# Patient Record
Sex: FEMALE | Race: BLACK OR AFRICAN AMERICAN | NOT HISPANIC OR LATINO | Employment: OTHER | URBAN - METROPOLITAN AREA
[De-identification: names, ages, dates, MRNs, and addresses within clinical notes are randomized per-mention and may not be internally consistent; named-entity substitution may affect disease eponyms.]

---

## 2017-08-21 DIAGNOSIS — I10 HYPERTENSION, ESSENTIAL: Primary | ICD-10-CM

## 2017-09-26 ENCOUNTER — HOSPITAL ENCOUNTER (OUTPATIENT)
Dept: CARDIOLOGY | Facility: HOSPITAL | Age: 52
Discharge: HOME | End: 2017-09-26
Attending: FAMILY MEDICINE
Payer: MEDICAID

## 2017-09-26 DIAGNOSIS — I10 HYPERTENSION, ESSENTIAL: ICD-10-CM

## 2017-09-26 LAB
DIASTOLIC DYSFUNCTION: NO
ESTIMATED PA SYSTOLIC PRESSURE: 20.47
GLOBAL PERICARDIAL EFFUSION: NORMAL
MITRAL VALVE MOBILITY: NORMAL
RETIRED EF AND QEF - SEE NOTES: 55 (ref 55–65)
TRICUSPID VALVE REGURGITATION: NORMAL

## 2017-09-26 PROCEDURE — 93306 TTE W/DOPPLER COMPLETE: CPT

## 2017-09-26 PROCEDURE — 93306 TTE W/DOPPLER COMPLETE: CPT | Mod: 26,,, | Performed by: INTERNAL MEDICINE

## 2018-01-24 ENCOUNTER — HOSPITAL ENCOUNTER (EMERGENCY)
Facility: HOSPITAL | Age: 53
Discharge: HOME | End: 2018-01-24
Attending: EMERGENCY MEDICINE
Payer: MEDICAID

## 2018-01-24 VITALS
DIASTOLIC BLOOD PRESSURE: 69 MMHG | OXYGEN SATURATION: 96 % | BODY MASS INDEX: 32.95 KG/M2 | RESPIRATION RATE: 18 BRPM | SYSTOLIC BLOOD PRESSURE: 150 MMHG | HEART RATE: 64 BPM | TEMPERATURE: 99 F | HEIGHT: 64 IN | WEIGHT: 193 LBS

## 2018-01-24 DIAGNOSIS — J06.9 ACUTE URI: Primary | ICD-10-CM

## 2018-01-24 DIAGNOSIS — R05.9 COUGH: ICD-10-CM

## 2018-01-24 LAB
DEPRECATED S PYO AG THROAT QL EIA: NEGATIVE
FLUAV AG SPEC QL IA: NEGATIVE
FLUBV AG SPEC QL IA: NEGATIVE
POCT GLUCOSE: 84 MG/DL (ref 70–110)
SPECIMEN SOURCE: NORMAL

## 2018-01-24 PROCEDURE — 87081 CULTURE SCREEN ONLY: CPT

## 2018-01-24 PROCEDURE — 25000003 PHARM REV CODE 250: Performed by: PHYSICIAN ASSISTANT

## 2018-01-24 PROCEDURE — 87880 STREP A ASSAY W/OPTIC: CPT

## 2018-01-24 PROCEDURE — 87400 INFLUENZA A/B EACH AG IA: CPT | Mod: 59

## 2018-01-24 PROCEDURE — 99284 EMERGENCY DEPT VISIT MOD MDM: CPT | Mod: 25

## 2018-01-24 PROCEDURE — 82962 GLUCOSE BLOOD TEST: CPT

## 2018-01-24 RX ORDER — IBUPROFEN 600 MG/1
600 TABLET ORAL
Status: COMPLETED | OUTPATIENT
Start: 2018-01-24 | End: 2018-01-24

## 2018-01-24 RX ORDER — BENZONATATE 100 MG/1
100 CAPSULE ORAL 3 TIMES DAILY PRN
Qty: 20 CAPSULE | Refills: 0 | Status: SHIPPED | OUTPATIENT
Start: 2018-01-24 | End: 2018-02-03

## 2018-01-24 RX ORDER — OXYMETAZOLINE HCL 0.05 %
1 SPRAY, NON-AEROSOL (ML) NASAL 2 TIMES DAILY
Qty: 15 ML | Refills: 0 | Status: SHIPPED | OUTPATIENT
Start: 2018-01-24 | End: 2018-01-27

## 2018-01-24 RX ORDER — TIZANIDINE 4 MG/1
4 TABLET ORAL EVERY 6 HOURS PRN
COMMUNITY
End: 2020-04-14

## 2018-01-24 RX ORDER — HYDROCHLOROTHIAZIDE 25 MG/1
25 TABLET ORAL DAILY
COMMUNITY
End: 2019-10-21

## 2018-01-24 RX ORDER — AMLODIPINE BESYLATE 10 MG/1
10 TABLET ORAL DAILY
COMMUNITY
End: 2019-10-21

## 2018-01-24 RX ADMIN — IBUPROFEN 600 MG: 600 TABLET, FILM COATED ORAL at 09:01

## 2018-01-24 NOTE — ED PROVIDER NOTES
"Encounter Date: 1/24/2018    SCRIBE #1 NOTE: I, Kareen Hebert, am scribing for, and in the presence of,  Jim Benitez PA-C. I have scribed the following portions of the note - Other sections scribed: HPI and ROS.       History     Chief Complaint   Patient presents with    Generalized Body Aches     body aches, cough - yellow, chills, sore throat, headaches x 4 days.  +nausea and fever.  denies vomiting or diarrhea.     CC: Generalized Body Aches    HPI: This 52 y.o female who has Asthma, GERD, Hypercholesteremia, Hypertension, Arthritis, and Thyroid disease presents to the ED c/o acute, constant, 9/10 generalized body aches that began 4 days ago.  Patient reports of associated productive cough with yellow sputum, fever, chills, nausea, sore throat, rhinorrhea, nasal congestion, "sharp" chest pain with and without cough, and frontal headache. Patient states she thinks she has the flu.  She reports she has been attempting OTC treatment, such as Dayquil, which has been elevating her blood sugars and blood pressure.  Patient reports she was recently evaluated and diagnosed with the flu 12/27/17 by her PCP after having similar symptoms.  Patient reports she thinks her symptoms returned due to her  currently having similar symptoms.   Patient reports she could not await to obtain another appointment with her PCP as she "could not breathe" as a result of her nasal congestion last night.  Patient denies emesis, diarrhea, abdominal pain, ear pain, rash, shortness of breath, or any other associated symptoms.  No alleviating factors.  Last administered antibiotics 1 year ago.      The history is provided by the patient. No  was used.     Review of patient's allergies indicates:   Allergen Reactions    Glimepiride Anaphylaxis    Lisinopril Anaphylaxis     Past Medical History:   Diagnosis Date    ACL tear     Arthritis     Asthma     GERD (gastroesophageal reflux disease)     High " cholesterol     Hypertension     Thyroid disease      Past Surgical History:   Procedure Laterality Date    HYSTERECTOMY      partial     History reviewed. No pertinent family history.  Social History   Substance Use Topics    Smoking status: Former Smoker    Smokeless tobacco: Never Used    Alcohol use Yes     Review of Systems   Constitutional: Positive for chills and fever.   HENT: Positive for congestion, rhinorrhea and sore throat. Negative for ear pain.    Eyes: Negative for pain.   Respiratory: Positive for cough. Negative for shortness of breath.    Cardiovascular: Positive for chest pain.   Gastrointestinal: Positive for nausea. Negative for abdominal pain, diarrhea and vomiting.   Genitourinary: Negative for dysuria.   Musculoskeletal: Positive for myalgias. Negative for back pain.   Skin: Negative for rash.   Neurological: Positive for headaches.       Physical Exam     Initial Vitals [01/24/18 0810]   BP Pulse Resp Temp SpO2   (!) 145/85 72 18 98.8 °F (37.1 °C) 99 %      MAP       105         Physical Exam    Nursing note and vitals reviewed.  Constitutional: She appears well-developed and well-nourished. She is not diaphoretic. No distress.   HENT:   Head: Normocephalic and atraumatic.   Nose: Nose normal.   Eyes: Conjunctivae and EOM are normal. Right eye exhibits no discharge. Left eye exhibits no discharge.   Neck: Normal range of motion. No tracheal deviation present. No JVD present.   Cardiovascular: Normal rate, regular rhythm and normal heart sounds. Exam reveals no friction rub.    No murmur heard.  Pulmonary/Chest: Breath sounds normal. No stridor. No respiratory distress. She has no wheezes. She has no rhonchi. She has no rales. She exhibits no tenderness.   Abdominal: Soft. She exhibits no distension. There is no tenderness. There is no rigidity, no rebound and no guarding.   Musculoskeletal: Normal range of motion.   Neurological: She is alert and oriented to person, place, and time.    Skin: Skin is warm and dry. No rash and no abscess noted. No erythema. No pallor.         ED Course   Procedures  Labs Reviewed   THROAT SCREEN, RAPID   CULTURE, STREP A,  THROAT   INFLUENZA A AND B ANTIGEN   POCT GLUCOSE          X-Rays:   Independently Interpreted Readings:   Chest X-Ray: No PNA     Medical Decision Making:   History:   Old Medical Records: I decided to obtain old medical records.  Initial Assessment:   52-year-old female with no pertinent past medical history presents to the emergency department with URI symptoms.  Mild hypertension, but vitals otherwise within normal limits and afebrile.  Independently Interpreted Test(s):   I have ordered and independently interpreted X-rays - see prior notes.  Clinical Tests:   Lab Tests: Ordered  Radiological Study: Ordered and Reviewed  ED Management:  Presentation most consistent with viral URI.  No pneumonia.  Flu negative.  I carefully consider but doubt cardiac and pulmonary etiology.  No other findings, including for meningitis, otitis media, mastoiditis, sinusitis, strep throat.    Patient remains well-appearing well in emergency department.  Vitals stable.  Advising follow up with PCP.  Strict return precautions discussed.  Patient agreeable to plan.  Other:   I have discussed this case with another health care provider.       <> Summary of the Discussion: Case reviewed with Dr. Gamble who is in agreement with my assessment and plan.             Scribe Attestation:   Scribe #1: I performed the above scribed service and the documentation accurately describes the services I performed. I attest to the accuracy of the note.    Attending Attestation:           Physician Attestation for Scribe:  Physician Attestation Statement for Scribe #1: I, Jim Benitez PA-C, reviewed documentation, as scribed by Kareen Hebert in my presence, and it is both accurate and complete.                 ED Course      Clinical Impression:   The primary encounter diagnosis  was Acute URI. A diagnosis of Cough was also pertinent to this visit.    Disposition:   Disposition: Discharged  Condition: Stable                        Jim Benitez PA-C  01/24/18 8528

## 2018-01-24 NOTE — ED TRIAGE NOTES
C/o chills, poor appetite, body aches, CP with cough, sore throat, chest congestion, SOB x 4 days.  Inhaler used today.   Dayquil, cough syrup taken today.

## 2018-01-26 LAB — BACTERIA THROAT CULT: NORMAL

## 2018-06-11 ENCOUNTER — HOSPITAL ENCOUNTER (EMERGENCY)
Facility: HOSPITAL | Age: 53
Discharge: HOME | End: 2018-06-11
Attending: EMERGENCY MEDICINE
Payer: MEDICAID

## 2018-06-11 VITALS
HEART RATE: 63 BPM | OXYGEN SATURATION: 97 % | RESPIRATION RATE: 18 BRPM | DIASTOLIC BLOOD PRESSURE: 83 MMHG | TEMPERATURE: 98 F | SYSTOLIC BLOOD PRESSURE: 135 MMHG | WEIGHT: 190 LBS | BODY MASS INDEX: 32.44 KG/M2 | HEIGHT: 64 IN

## 2018-06-11 DIAGNOSIS — R52 PAIN: ICD-10-CM

## 2018-06-11 DIAGNOSIS — M25.521 RIGHT ELBOW PAIN: ICD-10-CM

## 2018-06-11 DIAGNOSIS — M79.602 LEFT ARM PAIN: Primary | ICD-10-CM

## 2018-06-11 PROCEDURE — 99283 EMERGENCY DEPT VISIT LOW MDM: CPT | Mod: 25

## 2018-06-11 PROCEDURE — 90471 IMMUNIZATION ADMIN: CPT | Performed by: PHYSICIAN ASSISTANT

## 2018-06-11 PROCEDURE — 25000003 PHARM REV CODE 250: Performed by: PHYSICIAN ASSISTANT

## 2018-06-11 PROCEDURE — 90715 TDAP VACCINE 7 YRS/> IM: CPT | Performed by: PHYSICIAN ASSISTANT

## 2018-06-11 PROCEDURE — 63600175 PHARM REV CODE 636 W HCPCS: Performed by: PHYSICIAN ASSISTANT

## 2018-06-11 RX ORDER — MELOXICAM 7.5 MG/1
7.5 TABLET ORAL DAILY
Qty: 12 TABLET | Refills: 0 | OUTPATIENT
Start: 2018-06-11 | End: 2018-12-01

## 2018-06-11 RX ORDER — IBUPROFEN 600 MG/1
600 TABLET ORAL
Status: COMPLETED | OUTPATIENT
Start: 2018-06-11 | End: 2018-06-11

## 2018-06-11 RX ADMIN — IBUPROFEN 600 MG: 600 TABLET ORAL at 03:06

## 2018-06-11 RX ADMIN — CLOSTRIDIUM TETANI TOXOID ANTIGEN (FORMALDEHYDE INACTIVATED), CORYNEBACTERIUM DIPHTHERIAE TOXOID ANTIGEN (FORMALDEHYDE INACTIVATED), BORDETELLA PERTUSSIS TOXOID ANTIGEN (GLUTARALDEHYDE INACTIVATED), BORDETELLA PERTUSSIS FILAMENTOUS HEMAGGLUTININ ANTIGEN (FORMALDEHYDE INACTIVATED), BORDETELLA PERTUSSIS PERTACTIN ANTIGEN, AND BORDETELLA PERTUSSIS FIMBRIAE 2/3 ANTIGEN 0.5 ML: 5; 2; 2.5; 5; 3; 5 INJECTION, SUSPENSION INTRAMUSCULAR at 03:06

## 2018-06-11 NOTE — ED TRIAGE NOTES
"Patient presents to the ED via personal vehicle alone. Patient reports pain from left shoulder down to wrist x "longer than a month". Patient has been seeing a orthopedic doctor about left arm. Patient is also reports that she had a fishing hook stuck through the skin on upper right arm x 2 days ago. Patient denies swelling, drainage from site, fever, chills.  "

## 2018-06-11 NOTE — ED PROVIDER NOTES
"Encounter Date: 6/11/2018      This is an initial triage evaluation of Leonarda Roth, a 53 y.o., female  that presents to the Emergency Department with c/o bilateral arm pain and swelling.  Patient states that a fishook went through the elbow area of the right arm on Saturday.  Patient complains of swelling and pain to her left arm from wrist to the shoulder for a while    Pertinent exam findings: none    Orders Pending : xray    Destination: q track    I have evaluated and provided a medical screening exam with initial orders placed, if indicated, to expedite care. The patient is stable to return to the waiting area and will be placed in a treatment area when one is available. Care will be transferred to an alternate provider when patient is roomed from the lobby for full assessment including: history, physical exam, additional orders, and final disposition.      GONZALO Bustamante, PIEDADP-C     History     Chief Complaint   Patient presents with    Arm Injury     right upper arm. Pt states "I had a fish hook go through my right arm on Saturday".    Arm Swelling     left arm swelling. States "it's from my wrist to my shoulder. I do everything with this arm so i don't know what could be wrong with it"     53-year-old female with chronic neck pain and arthritis presents to emergency department for multiple complaints. Patient 1st notes removing a fishing hook from her right lateral elbow 2 days ago at home and is is requesting a tetanus shot.  Patient notes there is minimal pain at site and symptoms are overall improving.  Denies fever, numbness, and joint pain. Patient is also noting 4 month history of intermittent left upper extremity pain that is progressively worsening.  Patient notes that she is left hand dominant and frequently engages and routine/repetitive tasks at home.  No relief with her Percocet at home.  No history of similar symptoms. Denies chest pain and shortness of breath. Denies acute neck " trauma.          Review of patient's allergies indicates:   Allergen Reactions    Glimepiride Anaphylaxis    Lisinopril Anaphylaxis     Past Medical History:   Diagnosis Date    ACL tear     Arthritis     Asthma     GERD (gastroesophageal reflux disease)     High cholesterol     Hypertension     Thyroid disease      Past Surgical History:   Procedure Laterality Date    HYSTERECTOMY      partial     History reviewed. No pertinent family history.  Social History   Substance Use Topics    Smoking status: Former Smoker    Smokeless tobacco: Never Used    Alcohol use Yes     Review of Systems   Constitutional: Negative for fever.   HENT: Negative for sore throat.    Respiratory: Negative for cough and shortness of breath.    Cardiovascular: Negative for chest pain.   Gastrointestinal: Negative for abdominal pain, nausea and vomiting.   Musculoskeletal: Positive for arthralgias and myalgias. Negative for back pain.   Skin: Negative for color change, rash and wound.   Neurological: Negative for numbness and headaches.   All other systems reviewed and are negative.      Physical Exam     Initial Vitals [06/11/18 1340]   BP Pulse Resp Temp SpO2   (!) 153/86 86 16 97.9 °F (36.6 °C) 98 %      MAP       --         Physical Exam    Nursing note and vitals reviewed.  Constitutional: She appears well-developed and well-nourished. She is not diaphoretic. No distress.   HENT:   Head: Normocephalic and atraumatic.   Nose: Nose normal.   Eyes: Conjunctivae and EOM are normal. Right eye exhibits no discharge. Left eye exhibits no discharge.   Neck: Normal range of motion. No tracheal deviation present. No JVD present.   Cardiovascular: Normal rate, regular rhythm and normal heart sounds. Exam reveals no friction rub.    No murmur heard.  Pulmonary/Chest: Breath sounds normal. No stridor. No respiratory distress. She has no wheezes. She has no rhonchi. She has no rales. She exhibits no tenderness.   Musculoskeletal:   Two  punctate superficial well-healing abrasions just above the R lateral elbow. No erythema or swelling. Full ROM of joint without pain or complication. Radial pulses 2+ and equal. Sensation intact. Equal  strength.     No bony joint TTP of the LUE. No snuffbox TTP. Full ROM of LUE joints, which reproduces pain to L posterior shoulder and L volar wrist. No swelling or erythema. Soft compartments. Radial pulses 2+ and equal with good skin perfusion. Sensation intact. (+) Tinel's at 4 seconds.     No midline TTP down neck. Full cervical ROM without pain.    Neurological: She is alert and oriented to person, place, and time.   Skin: Skin is warm and dry. No rash and no abscess noted. No erythema. No pallor.         ED Course   Procedures  Labs Reviewed - No data to display       X-Ray Humerus 2 View Left   Final Result      No acute displaced fracture-dislocation identified.         Electronically signed by: Lane Donahue MD   Date:    06/11/2018   Time:    14:33      X-Ray Forearm Left   Final Result      No acute displaced fracture-dislocation identified.         Electronically signed by: Lane Donahue MD   Date:    06/11/2018   Time:    14:32           Medical Decision Making:   History:   Old Medical Records: I decided to obtain old medical records.  Initial Assessment:   53-year-old female with multiple complaints. Reports fissure closer moved to right upper extremity and atraumatic left upper extremity pain.  Denies chest pain, shortness of breath, fever, and numbness.  Independently Interpreted Test(s):   I have ordered and independently interpreted X-rays - see summary below.       <> Summary of X-Ray Reading(s): No bony lesions  Clinical Tests:   Radiological Study: Ordered and Reviewed  ED Management:  Presentation most suspicious for bursitis of the left shoulder and carpal tunnel syndrome in this patient that is left hand dominant and engages in repetitive activities with her left upper extremity on a routine  basis and has same symptoms for several months. X-ray show no acute fracture or bony lesions. No neurovascular compromise.  No signs of acute infectious etiology, including for cellulitis and septic joint.  I doubt occult cardiac or pulmonary in etiology, including for pneumothorax.  No signs of infection to site where fishhook was removed by patient.  No visible or palpable foreign body.    Tetanus updated.  Pain controlled in the ED.  Sent home with supportive care.  Advising follow-up with PCP and Orthopedics.  Strict return by options discussed with patient.  Patient agreeable to plan.  Other:   I have discussed this case with another health care provider.       <> Summary of the Discussion: Discussed with attending                      Clinical Impression:   The primary encounter diagnosis was Left arm pain. Diagnoses of Pain and Right elbow pain were also pertinent to this visit.      Disposition:   Disposition: Discharged  Condition: Stable                        Jim Benitez PA-C  06/11/18 1925

## 2018-09-11 DIAGNOSIS — M51.36 LUMBAR DEGENERATIVE DISC DISEASE: Primary | ICD-10-CM

## 2018-09-18 ENCOUNTER — CLINICAL SUPPORT (OUTPATIENT)
Dept: REHABILITATION | Facility: OTHER | Age: 53
End: 2018-09-18
Attending: FAMILY MEDICINE
Payer: MEDICAID

## 2018-09-18 DIAGNOSIS — G89.29 CHRONIC BILATERAL LOW BACK PAIN WITH BILATERAL SCIATICA: ICD-10-CM

## 2018-09-18 DIAGNOSIS — M54.41 CHRONIC BILATERAL LOW BACK PAIN WITH BILATERAL SCIATICA: ICD-10-CM

## 2018-09-18 DIAGNOSIS — M54.42 CHRONIC BILATERAL LOW BACK PAIN WITH BILATERAL SCIATICA: ICD-10-CM

## 2018-09-18 PROBLEM — M54.40 CHRONIC BILATERAL LOW BACK PAIN WITH SCIATICA: Status: ACTIVE | Noted: 2018-09-18

## 2018-09-18 PROCEDURE — 97110 THERAPEUTIC EXERCISES: CPT

## 2018-09-18 PROCEDURE — 97162 PT EVAL MOD COMPLEX 30 MIN: CPT

## 2018-09-18 NOTE — PROGRESS NOTES
[facility] - PHYSICAL THERAPY EVALUATION     Name: Leonarda PABLO Gonzalez  Clinic Number: [id number]    Leonarda PABLO is a 53 y.o. female evaluated on 09/18/2018  Time In: 9:00   Time out: 10:30    Diagnosis:   Encounter Diagnosis   Name Primary?    Chronic bilateral low back pain with bilateral sciatica      Physician: Winsome Yang MD  Treatment Orders: PT Eval and Treat    Past Medical History:   Diagnosis Date    ACL tear     Arthritis     Asthma     GERD (gastroesophageal reflux disease)     High cholesterol     Hypertension     Thyroid disease      Current Outpatient Medications   Medication Sig    (Magic mouthwash) 1:1:1 Benadryl 12.5mg/5ml liq, aluminum & magnesium hydroxide-simehticone (Maalox), lidocaine viscous 2% Swish and spit 5 mLs every 4 (four) hours as needed (pain).    albuterol (PROVENTIL) 2 MG Tab Take 2 mg by mouth 3 (three) times daily.    alprazolam (XANAX) 2 MG Tab Take by mouth nightly as needed.    amLODIPine (NORVASC) 10 MG tablet Take 10 mg by mouth once daily.    ergocalciferol (VITAMIN D2) 50,000 unit Cap Take 50,000 Units by mouth every 7 days.    hydroCHLOROthiazide (HYDRODIURIL) 25 MG tablet Take 25 mg by mouth once daily.    hydrOXYzine pamoate (VISTARIL) 25 MG Cap Take 1 capsule (25 mg total) by mouth every 6 (six) hours as needed.    levothyroxine (SYNTHROID) 50 MCG tablet Take 50 mcg by mouth once daily.      meloxicam (MOBIC) 7.5 MG tablet Take 1 tablet (7.5 mg total) by mouth once daily.    metformin (GLUCOPHAGE) 500 MG tablet Take 500 mg by mouth 2 (two) times daily with meals.    omeprazole (PRILOSEC) 20 MG capsule Take 20 mg by mouth once daily.    oxycodone (ROXICODONE) 15 MG Tab Take 10 mg by mouth every 4 (four) hours as needed.    potassium bicarb-citric acid 10 mEq TbEF Take by mouth once.    pravastatin (PRAVACHOL) 20 MG tablet Take 20 mg by mouth once daily.      tiZANidine (ZANAFLEX) 4 MG tablet Take 4 mg by mouth every 6 (six) hours as needed.     vitamin E 100 UNIT capsule Take 100 Units by mouth once daily.     No current facility-administered medications for this visit.      Review of patient's allergies indicates:   Allergen Reactions    Glimepiride Anaphylaxis    Lisinopril Anaphylaxis     Precautions: Left ACL tear, fear avoidance, history of litigation for injury, left forearm pain, neck pain      Visit # authorized: 20  Authorization period: 12/31/18  Plan of care Expiration: 12/17/18, Sent 09/18/2018        HISTORY   History of Present Illness: Pt reports primary pain is in the mid-back and referrs into low back. Pt describes the pain as aching and occasionally sharp/shooting. Pt reports shooting pain from the bilateral foot to the knee and hip. She reports diabetic symptoms of bilateral foot tingling with prolong standing.  She uses medical transportation to get around but can sometimes get  to drive her secondary to car being broken down.   Her low back and neck pain started when book case fell on back at [facility]. Was in litigation but may appeal results at some point. Fell on the job several times when working. No worker's comp at this time.     Secondary complaints:   Pt reports torn ACL in left leg, working on getting surgery. Feels the pain is worsening. Bracing can help. Right knee arthritis.   Left elbow injury in June after a fish hook when through arm. Healed ok but had some left forearm overuse.   Pt also reports her doctor doesn't want her to walk on concrete     Diagnostic Tests: Pt reports MRI from [facility] revealed multiple disc herniations       Pain Scale: Leonarda PABLO rates pain on a scale of 0-10 to be 9 at worst; 6 currently; 5 at best using VAS.   Pain location: Low back, mid-back     Aggravating factors: Prolonged standing, walking, bending, lifting, prolonged sitting   Easing Factors: Medication   Disturbed Sleep: Yes, not sleeping well. Sleeps with sitting with back support. Can sometimes  sleep    Pattern of pain questions:  1.  Where is your pain the worst? Low back   2.  Is your pain constant or intermittent? Constant   3.  Does bending forward make your typical pain worse? Yes  4.  Since the start of your back pain, has there been a change in your bowel or bladder? No  5.  What can't you do now that you use to be able to do? 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   Bladder/ bowel: No  Falls: Yes, can stumble with long term walkiing secondary to ACL tear  General health: Good   Night pain: No   Unexplained weight loss: No     OBJECTIVE     POSTURE  Posture Alignment :slouched posture  Postural examination/scapula alignment: Rounded shoulder, Head forward, Increased kyphosis and Increased lordosis, left shoulder elevated compared to right left PSIS mildly elevated compared to right   Joint integrity: Pt unable to tolerate prone postion, TTP through lumbar paraspinal region and sacrum.   Sitting: Poor  Standing: Poor  Correction of posture: Added slimline roll, Improved posture in sitting.     SLS: Unable to perform  Trendelenburg: Right +, Left +  Functional squat: NT    MOVEMENT LOSS    ROM Loss   Flexion major loss and ERP, NW   Extension major loss   Side bending Right moderate loss   Side bending Left moderate loss   Rotation Right moderate loss   Rotation Left major loss and ERP       Lower Extremity Strength   Right LE  Left LE   Hip flexion: 3+/5 Hip flexion: 3+/5   Hip  extension:  NT Hip extension: NT   Hip abduction: 4-/5 Hip abduction: 3+/5   Hip adduction:  4-/5 Hip adduction:  4-/5   Hip Internal rotation   4/5 Hip Internal rotation 4/5   Knee Flexion 4/5 Knee Flexion 4/5   Knee Extension 3+/5 Knee Extension NT   Ankle dorsiflexion: 4/5 Ankle dorsiflexion: 4/5   Ankle plantarflexion: 4/5 Ankle plantarflexion: 4/5         GAIT:  Assistive Device used: none  Level of Assistance: independent  Patient displays the following gait deviations: antalgic gait.     Special Tests:   Test Name  Test Result   Prone Instability Test NT   SI Joint Provocation Test NT   Straight Leg Raise NT   Neural Tension Test (--)   Crossed Straight Leg Raise (--)   Walking on toes (--)   Walking on heels  (--)       NEUROLOGICAL SCREENING     Sensory deficit: LE intact to light touch    Reflexes:    Left Right   Patella Tendon 2+ 2+   Achilles Tendon 2+ 2+   Babinski NT NT   Clonus - -     REPEATED TEST  MOVEMENTS:  Repeated Flexion in Standing end range pain  no effect   Repeated Extension in Standing better  With LE supported    Repeated Flexion in lying Unable to tolerate supine postion   Repeated Extension in lying  Unable to attempt        STATIC TESTS   Sitting slouched  no effect   Sitting erect better   Standing slouched no effect   Standing erect  no effect   Lying prone in extension  NT   Long sitting   NT       Baseline Isometric Testing on Med X equipment: Testing administered by PT    Baseline IM Testing Results:   Date of testin2018  ROM 21-9 deg   Max Peak Torque 26    Min Peak Torque 13    Flex/Ext Ratio 2   % below normative data -88     Pt reported she was fearful to give full effort and potentially hurt her back. She was able to tolerate warm up at 30 ft/lbs without increased symptoms.     FOTO: Focus on Therapeutic Outcomes   Category: lumbar   % Impaired: 58%   Current Score  = CK = at least 40% but < 60% impaired, limited or restricted  Goal at Discharge Score = CK = at  least 40% but < 60% impaired, limited or restricted    Score interpretation is as follows:     TEST SCORE  Modifier  Impairment Limitation Restriction    0/50  CH  0 % impaired, limited or restricted   1-9/50  CI  @ least 1% but less than 20% impaired, limited or restricted   10-19/50  CJ  @ least 20%<40% impaired, limited or restricted   20-29/50  CK  @ least 40%<60% impaired, limited or restricted   30-39/50  CL  @ least 60% <80% impaired, limited or restricted   40-49/50  CM  @ least 80%<100% impaired limited or restricted   50/50  CN  100% impaired, limited or restricted       Treatment   Time In: 9:50  Time Out: 10:45    PT Evaluation Completed? Yes  Discussed Plan of Care with patient: Yes      Written Home Exercises Provided:   Handouts were given to the patient. Pt demo good understanding of the education provided. Leonarda PABLO demonstrated good return demonstration of activities.     - Patient received education regarding proper posture and body mechanics.    - Conner roll tried, recommended, and purchase information was provided.    - Patient received a handout regarding anticipated muscular soreness following the isometric test and strategies for management were reviewed with patient including stretching, using ice and scheduled rest.     HEP as follows       Extension in standing with LE supported 10x, 3x/daily  Sidelying pelvic tilts 5-10 s/h 5x, 2x daily  Seated cervical retractions 10x, 2x daily   Seated Ice 10-20 min, 2x daily      Pt was instructed in and performed the following:   Cardiovascular exercise and therapeutic exercise to improve posture, lumbar/cervical ROM, strength, and muscular endurance as follows:     Leonarda PABLO received therapeutic exercises to develop/improve posture, lumbar/cervical ROM, strength and muscular endurance for 45 minutes including the following exercises: med-x lumbar machine testing  [facility] 9/18/2018   Visit Number 1   VAS Pain Rating 6   Lumbar Extension  Seat Pad 2   Femur Restraint 7   Top Dead Center 24   Counterweight 211   Lumbar Flexion 21   Lumbar Extension 9   Lumbar Peak Torque 26   Min Torque 13   Test Percent Below Normative Data 88   Lumbar Weight 30   Ice - Sitting 10       Leonarda PABLO received the following manual therapy techniques: None     Assessment   This is a 53 y.o. female referred to [facility] and presents with a medical diagnosis of   Encounter Diagnosis   Name Primary?    Chronic bilateral low back pain with bilateral sciatica     and demonstrates limitations as described below in the problem list. Pt rehab potential is Good. Pt presents with lumbar dysfunction, decreased lumbar strength and ROM compared to norms, decreased LE ROM, decreased LE strength, decreased hip flexibility, poor postural alignment, poor body mechanics, impaired SLS balance, antalgic gait. Pt reported feeling better, more flexible by end of session. She expresses she was fearful of increasing symptoms on Med X isometric testing but was able to tolerate warm up at 30 ft/lbs without difficulty. Pt reports feeling very optimistic about becoming more active and exercising again.       Pain Pattern: 1 PEN (Pt high symptom irritably, only able to tolerate side lying and standing Ext with LE supported. Plan on progressing with caution)         Patient received education on the [facility] program, purpose of the isometric test, progression of back strengthening as well as wellness approach and systemic strengthening.  Details of the program were discussed.  Reviewed that patient should feel support/pressure from med ex restraints but no pain or discomfort and patient expressed understanding.    Based on the above history and physical examination an active physical therapy program is recommended.  Pt will continue to benefit from skilled outpatient physical therapy to address the deficits listed below in the chart, provide pt/family education and to maximize pt's level  of independence in the home and community environment. .     No environmental, cultural, spiritual, developmental or education needs expressed or noted    Medical necessity is demonstrated by the following problem list.    Pt presents with the following impairments:   History  Co-morbidities and personal factors that may impact the plan of care Examination  Body Structures and Functions, activity limitations and participation restrictions that may impact the plan of care Clinical Presentation   Decision Making/ Complexity Score   Co-morbidities:   See precautions        Personal Factors:   coping style  lifestyle  attitudes  Sedentary, fearful of movement Body Regions:   neck  back  lower extremities  trunk    Body Systems:   gross symmetry  ROM  strength  gross coordinated movement  balance  gait  transitions  motor control    Activity limitations:   Learning and applying knowledge  no deficits    General Tasks and Commands  no deficits    Communication  no deficits    Mobility  lifting and carrying objects  walking  driving (bike, car, motorcycle)    Self care  no deficits    Domestic Life  shopping  cooking  doing house work (cleaning house, washing dishes, laundry)    Interactions/Relationships  no deficits    Life Areas  employment    Community and Social Life  community life  recreation and leisure  Taoist and spirituality    Participation Restrictions:   Unable to work or participate in social events     evolving clinical presentation with changing clinical characteristics   Moderate          GOALS: Pt is in agreement with the following goals.    Short term goals:  6 weeks or 10 visits   1.  Pt will demonstrate increased lumbar ROM by at least 6 degrees from the initial ROM value with improvements noted in functional ROM and ability to perform ADLs  2.  Pt will demonstrate increased maximum isometric torque value by 10% when compared to the initial value resulting in improved ability to perform bending,  "lifting, and carrying activities safely, confidently.  3.  Patient report a reduction in worst pain score by 1-2 points for improved tolerance during work and recreational activities  4.  Pt able to perform HEP correctly with minimal cueing or supervision for therapist    Long term goals: 13 weeks or 20 visits   1. Pt will demonstrate increased lumbar ROM by at least 12 degrees from initial ROM value, resulting in improved ability to perform functional fwd bending while standing and sitting.   2. Pt will demonstrate increased maximum isometric torque value by 30% when compared to the initial value resulting in improved ability to perform bending, lifting, and carrying activities safely, confidently.  3. Pt to demonstrate ability to independently control and reduce their pain through posture positioning and mechanical movements throughout a typical day.  4.  Patient will demonstrate improved overall function per FOTO Survey to CL = least 60% but < 80% impaired, limited or restricted score or less.  5. Pt will demonstrate ability to walk up to 10 minutes without significant increases in symptoms.   6. Pt will report confidence to return to exercising without significantly increasing symptoms.       Plan   Outpatient physical therapy 2x week for 13 weeks or 20 visits to include the following:   - Patient education  - Therapeutic exercise  - Manual therapy  - Performance testing   - Neuromuscular Re-education  - Therapeutic activity   - Modalities    Pt may be seen by PTA as part of the rehabilitation team.     Therapist: Dhara Corbin, PT  9/18/2018    "I certify the need for these services furnished under this plan of treatment and while under my care."    ____________________________________  Physician/Referring Practitioner    _______________  Date of Signature            "

## 2018-12-01 ENCOUNTER — HOSPITAL ENCOUNTER (EMERGENCY)
Facility: HOSPITAL | Age: 53
Discharge: HOME | End: 2018-12-01
Attending: EMERGENCY MEDICINE
Payer: MEDICAID

## 2018-12-01 VITALS
HEIGHT: 64 IN | TEMPERATURE: 98 F | WEIGHT: 205 LBS | SYSTOLIC BLOOD PRESSURE: 163 MMHG | BODY MASS INDEX: 35 KG/M2 | HEART RATE: 73 BPM | OXYGEN SATURATION: 96 % | DIASTOLIC BLOOD PRESSURE: 87 MMHG | RESPIRATION RATE: 18 BRPM

## 2018-12-01 DIAGNOSIS — S80.01XA CONTUSION OF RIGHT KNEE, INITIAL ENCOUNTER: Primary | ICD-10-CM
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
## 2018-12-01 LAB — POCT GLUCOSE: 91 MG/DL (ref 70–110)

## 2018-12-01 PROCEDURE — 99284 EMERGENCY DEPT VISIT MOD MDM: CPT | Mod: 25

## 2018-12-01 PROCEDURE — 96374 THER/PROPH/DIAG INJ IV PUSH: CPT

## 2018-12-01 PROCEDURE — 63600175 PHARM REV CODE 636 W HCPCS: Performed by: NURSE PRACTITIONER

## 2018-12-01 PROCEDURE — 82962 GLUCOSE BLOOD TEST: CPT

## 2018-12-01 RX ORDER — DICLOFENAC SODIUM 10 MG/G
2 GEL TOPICAL 2 TIMES DAILY
Qty: 1 TUBE | Refills: 0 | Status: SHIPPED | OUTPATIENT
Start: 2018-12-01 | End: 2019-10-21 | Stop reason: SDUPTHER

## 2018-12-01 RX ORDER — MORPHINE SULFATE 4 MG/ML
5 INJECTION, SOLUTION INTRAMUSCULAR; INTRAVENOUS
Status: COMPLETED | OUTPATIENT
Start: 2018-12-01 | End: 2018-12-01

## 2018-12-01 RX ORDER — FUROSEMIDE 20 MG/1
20 TABLET ORAL 2 TIMES DAILY
COMMUNITY
End: 2019-10-21 | Stop reason: SDUPTHER

## 2018-12-01 RX ADMIN — MORPHINE SULFATE 5 MG: 4 INJECTION INTRAVENOUS at 04:12

## 2018-12-01 NOTE — ED PROVIDER NOTES
Encounter Date: 12/1/2018    SCRIBE #1 NOTE: I, Cassandra Dubois, am scribing for, and in the presence of,  Robe Kessler - NP. I have scribed the following portions of the note - Other sections scribed: HPI, ROS.       History     Chief Complaint   Patient presents with    Knee Pain     slipped and fell Thanksgiving Day on rug; denies hitting head or LOC; right knee pain and swelling since     CC: Knee Pain    54 y/o female with ACL tear, asthma, DM, high cholesterol, HTN, arthritis, and thyroid disease presents to the ED c/o acute onset generalized R knee pain (worse to frontal aspect) with associated bruising due to an accidental mechanical fall onto her knee on 11/22/18. The pain is severe (7/10) and worse with palpation and movement. The patient reports tripping on a rug causing her to fall on her b/l knees and b/l hands. She reports putting more of her weight on her R knee because she has a torn ACL to her L knee. The patient attempted her prescribed 15 mg Oxycodone as well as applying ice packs and elevated her R leg with no relief to her pain. The patient denies head trauma, LOC, or fever. No other symptoms reported.      The history is provided by the patient. No  was used.     Review of patient's allergies indicates:   Allergen Reactions    Glimepiride Anaphylaxis    Lisinopril Anaphylaxis     Past Medical History:   Diagnosis Date    ACL tear     Arthritis     Asthma     Diabetes mellitus     GERD (gastroesophageal reflux disease)     High cholesterol     Hypertension     Thyroid disease      Past Surgical History:   Procedure Laterality Date    HYSTERECTOMY      partial     History reviewed. No pertinent family history.  Social History     Tobacco Use    Smoking status: Former Smoker    Smokeless tobacco: Never Used   Substance Use Topics    Alcohol use: No     Frequency: Never    Drug use: No     Review of Systems   Constitutional: Negative for chills and fever.   HENT:  Negative for congestion, ear pain, rhinorrhea and sore throat.    Eyes: Negative for redness.   Respiratory: Negative for cough and shortness of breath.    Cardiovascular: Negative for chest pain.   Gastrointestinal: Negative for abdominal pain, diarrhea, nausea and vomiting.   Genitourinary: Negative for decreased urine volume, difficulty urinating, dysuria, frequency, hematuria and urgency.   Musculoskeletal: Negative for back pain and neck pain.        (+) generalized R knee pain with associated bruising   Skin: Negative for rash.   Neurological: Negative for headaches.        (-) head trauma  (-) LOC   Psychiatric/Behavioral: Negative for confusion.   All other systems reviewed and are negative.      Physical Exam     Initial Vitals [12/01/18 1426]   BP Pulse Resp Temp SpO2   (!) 133/92 84 20 98 °F (36.7 °C) 99 %      MAP       --         Physical Exam    Nursing note and vitals reviewed.  Constitutional: She appears well-developed and well-nourished.   HENT:   Head: Normocephalic and atraumatic.   Right Ear: External ear normal.   Left Ear: External ear normal.   Nose: Nose normal.   Eyes: Conjunctivae and EOM are normal. Right eye exhibits no discharge. Left eye exhibits no discharge.   Neck: Normal range of motion. Neck supple. No tracheal deviation present.   Cardiovascular: Normal rate.   Pulmonary/Chest: No stridor. No respiratory distress.   Abdominal: Soft. She exhibits no distension. There is no tenderness.   Musculoskeletal: Normal range of motion.        Right knee: She exhibits ecchymosis and bony tenderness. She exhibits normal range of motion, no swelling, no effusion, no deformity, no erythema, normal alignment, no LCL laxity, normal patellar mobility and no MCL laxity. Tenderness (Anterior) found.   Diffuse bruising to the right anterior knee.  Bruising appears to be resolving.  No swelling or effusion.  No abnormal patellar mobility or ligamentous laxity.  Negative anterior and posterior drawer  test.  Range of motion is not decreased.   Neurological: She is alert and oriented to person, place, and time. She has normal strength. No cranial nerve deficit or sensory deficit.   Skin: Skin is warm and dry.   Psychiatric: She has a normal mood and affect. Her behavior is normal. Judgment and thought content normal.         ED Course   Procedures  Labs Reviewed   POCT GLUCOSE   POCT GLUCOSE MONITORING CONTINUOUS          Imaging Results          X-Ray Knee 3 View Right (Final result)  Result time 12/01/18 16:16:25    Final result by Pavel Nesbitt DO (12/01/18 16:16:25)                 Impression:      As above      Electronically signed by: Pavel Nesbitt DO  Date:    12/01/2018  Time:    16:16             Narrative:    EXAMINATION:  XR KNEE 3 VIEW RIGHT    CLINICAL HISTORY:  Unspecified fall, initial encounter    TECHNIQUE:  AP, lateral, and Merchant views of the right knee were performed.    COMPARISON:  None    FINDINGS:  No acute fracture or dislocation identified.  Mild degenerative change of the patellofemoral compartment.  No joint effusion.                                 Medical Decision Making:   History:   Old Medical Records: I decided to obtain old medical records.  Differential Diagnosis:   Fracture, dislocation, patellar dislocation, ligamentous injury, septic arthritis, others  Clinical Tests:   Radiological Study: Ordered and Reviewed  ED Management:  53-year-old female presenting for evaluation of right knee pain secondary to slipping and falling and landing on to the front of her knee a little over a week ago.  There is dark bruising and tenderness to the generalized anterior aspect of the knee.  No swelling, effusion, deformity, ligamentous laxity, erythema, warmth, or any other appreciable abnormalities.  Negative anterior and posterior drawer test.  No abnormal patellar mobility.  Active and passive range of motion of the knee is normal and nonpainful.  X-ray shows no evidence of  fracture or dislocation.  Doubt emergent pathology.  Applied Ace wrap.  Will not treat with NSAIDs or steroids given patient's renal history.  Advised patient to take her normal daily pain medications and follow up with her PCP for re-evaluation and further treatment. ED return precautions given. All questions regarding diagnosis and plan were answered to the patient's fullest possible satisfaction. Patient expressed understanding of diagnosis, discharge instructions, and return precautions.    My attending physician was available for consultation during this case            Scribe Attestation:   Scribe #1: I performed the above scribed service and the documentation accurately describes the services I performed. I attest to the accuracy of the note.    Attending Attestation:           Physician Attestation for Scribe:  Physician Attestation Statement for Scribe #1: I, Robe Kessler - ALLYSSA, reviewed documentation, as scribed by Cassandra Dubois in my presence, and it is both accurate and complete.                    Clinical Impression:   The primary encounter diagnosis was Contusion of right knee, initial encounter. A diagnosis of Fall was also pertinent to this visit.      Disposition:   Disposition: Discharged  Condition: Stable                        Robe Kessler NP  12/01/18 1980

## 2018-12-01 NOTE — ED PROVIDER NOTES
Encounter Date: 12/1/2018       History     Chief Complaint   Patient presents with    Knee Pain     slipped and fell Thanksgiving Day on rug; denies hitting head or LOC; right knee pain and swelling since     HPI  Review of patient's allergies indicates:   Allergen Reactions    Glimepiride Anaphylaxis    Lisinopril Anaphylaxis     Past Medical History:   Diagnosis Date    ACL tear     Arthritis     Asthma     Diabetes mellitus     GERD (gastroesophageal reflux disease)     High cholesterol     Hypertension     Thyroid disease      Past Surgical History:   Procedure Laterality Date    HYSTERECTOMY      partial     History reviewed. No pertinent family history.  Social History     Tobacco Use    Smoking status: Former Smoker    Smokeless tobacco: Never Used   Substance Use Topics    Alcohol use: No     Frequency: Never    Drug use: No     Review of Systems    Physical Exam     Initial Vitals [12/01/18 1426]   BP Pulse Resp Temp SpO2   (!) 133/92 84 20 98 °F (36.7 °C) 99 %      MAP       --         Physical Exam    ED Course   Procedures  Labs Reviewed   POCT GLUCOSE   POCT GLUCOSE MONITORING CONTINUOUS          Imaging Results          X-Ray Knee 3 View Right (Final result)  Result time 12/01/18 16:16:25    Final result by Pavel Nesbitt DO (12/01/18 16:16:25)                 Impression:      As above      Electronically signed by: Pavel Nesbitt DO  Date:    12/01/2018  Time:    16:16             Narrative:    EXAMINATION:  XR KNEE 3 VIEW RIGHT    CLINICAL HISTORY:  Unspecified fall, initial encounter    TECHNIQUE:  AP, lateral, and Merchant views of the right knee were performed.    COMPARISON:  None    FINDINGS:  No acute fracture or dislocation identified.  Mild degenerative change of the patellofemoral compartment.  No joint effusion.                                              Attending Attestation:     Physician Attestation Statement for NP/PA:       Other NP/PA Attestation Additions:       Medical Decision Making: I did not evaluate or discussed this case with the nurse practitioner.  All care was provided by the nurse practitioner.  I was available for consultation throughout the entire visit.                    Clinical Impression:   {Add your Clinical Impression here. If you haven't documented one yet, please pend the note, finalize a Clinical Impression, and refresh your note before signing.:[id number]}

## 2018-12-01 NOTE — ED TRIAGE NOTES
Patient presents to the ED via personal transportation alone. Patient reports that she slipped and fell in a store, landing on both knees and hands on 11/20/18. Patient states that she is still having pain to right knee. Bruising noted to knee. Patient is taking her pain medication at home, elevating leg, and using ice packs.

## 2018-12-04 NOTE — ED PROVIDER NOTES
Encounter Date: 12/1/2018       History     Chief Complaint   Patient presents with    Knee Pain     slipped and fell Thanksgiving Day on rug; denies hitting head or LOC; right knee pain and swelling since     HPI  Review of patient's allergies indicates:   Allergen Reactions    Glimepiride Anaphylaxis    Lisinopril Anaphylaxis     Past Medical History:   Diagnosis Date    ACL tear     Arthritis     Asthma     Diabetes mellitus     GERD (gastroesophageal reflux disease)     High cholesterol     Hypertension     Thyroid disease      Past Surgical History:   Procedure Laterality Date    HYSTERECTOMY      partial     History reviewed. No pertinent family history.  Social History     Tobacco Use    Smoking status: Former Smoker    Smokeless tobacco: Never Used   Substance Use Topics    Alcohol use: No     Frequency: Never    Drug use: No     Review of Systems    Physical Exam     Initial Vitals [12/01/18 1426]   BP Pulse Resp Temp SpO2   (!) 133/92 84 20 98 °F (36.7 °C) 99 %      MAP       --         Physical Exam    ED Course   Procedures  Labs Reviewed   POCT GLUCOSE          Imaging Results          X-Ray Knee 3 View Right (Final result)  Result time 12/01/18 16:16:25    Final result by Pavel Nesbitt DO (12/01/18 16:16:25)                 Impression:      As above      Electronically signed by: Pavel Nesbitt DO  Date:    12/01/2018  Time:    16:16             Narrative:    EXAMINATION:  XR KNEE 3 VIEW RIGHT    CLINICAL HISTORY:  Unspecified fall, initial encounter    TECHNIQUE:  AP, lateral, and Merchant views of the right knee were performed.    COMPARISON:  None    FINDINGS:  No acute fracture or dislocation identified.  Mild degenerative change of the patellofemoral compartment.  No joint effusion.                                                      Clinical Impression:       ICD-10-CM ICD-9-CM   1. Contusion of right knee, initial encounter S80.01XA 924.11   2. Fall W19.XXXA E888.9                                 Cielo Bustamante, NP  05/14/19 1153

## 2019-05-03 ENCOUNTER — DOCUMENTATION ONLY (OUTPATIENT)
Dept: REHABILITATION | Facility: OTHER | Age: 54
End: 2019-05-03

## 2019-06-03 ENCOUNTER — DOCUMENTATION ONLY (OUTPATIENT)
Dept: REHABILITATION | Facility: OTHER | Age: 54
End: 2019-06-03

## 2019-06-03 NOTE — PROGRESS NOTES
DC NOTE FOR [facility] PT    Pt was treated one time on 9/18/19.  Treatment consisted of initial evaluation for lower back pain.  Goals of PT not met.  Pt did not return for any for any further follow.  DC as pt did not return and self DC'ed.

## 2020-09-14 ENCOUNTER — TELEPHONE (OUTPATIENT)
Dept: ORTHOPEDICS | Facility: CLINIC | Age: 55
End: 2020-09-14

## 2020-09-14 DIAGNOSIS — M25.569 KNEE PAIN, UNSPECIFIED CHRONICITY, UNSPECIFIED LATERALITY: ICD-10-CM

## 2020-09-14 DIAGNOSIS — M89.8X5 PAIN IN FEMUR: Primary | ICD-10-CM

## 2021-06-14 ENCOUNTER — HOSPITAL ENCOUNTER (EMERGENCY)
Facility: HOSPITAL | Age: 56
Discharge: HOME | End: 2021-06-14
Attending: EMERGENCY MEDICINE
Payer: MEDICAID

## 2021-06-14 VITALS
WEIGHT: 220 LBS | TEMPERATURE: 98 F | OXYGEN SATURATION: 100 % | HEART RATE: 84 BPM | HEIGHT: 64 IN | SYSTOLIC BLOOD PRESSURE: 156 MMHG | BODY MASS INDEX: 37.56 KG/M2 | RESPIRATION RATE: 16 BRPM | DIASTOLIC BLOOD PRESSURE: 87 MMHG

## 2021-06-14 DIAGNOSIS — M25.571 ACUTE RIGHT ANKLE PAIN: Primary | ICD-10-CM

## 2021-06-14 DIAGNOSIS — T14.90XA TRAUMA: ICD-10-CM

## 2021-06-14 DIAGNOSIS — S90.819A FOOT ABRASION: ICD-10-CM

## 2021-06-14 PROCEDURE — 99284 EMERGENCY DEPT VISIT MOD MDM: CPT | Mod: 25,ER

## 2021-06-14 RX ORDER — MELOXICAM 15 MG/1
15 TABLET ORAL DAILY
Qty: 30 TABLET | Refills: 0 | Status: SHIPPED | OUTPATIENT
Start: 2021-06-14 | End: 2021-12-10 | Stop reason: SDUPTHER

## 2021-06-14 RX ORDER — FAMOTIDINE 20 MG/1
20 TABLET, FILM COATED ORAL 2 TIMES DAILY
Qty: 60 TABLET | Refills: 0 | Status: SHIPPED | OUTPATIENT
Start: 2021-06-14 | End: 2022-06-14

## 2021-06-15 ENCOUNTER — TELEPHONE (OUTPATIENT)
Dept: PODIATRY | Facility: CLINIC | Age: 56
End: 2021-06-15

## 2022-02-07 ENCOUNTER — TELEPHONE (OUTPATIENT)
Dept: SPORTS MEDICINE | Facility: CLINIC | Age: 57
End: 2022-02-07
Payer: MEDICAID

## 2022-02-07 NOTE — TELEPHONE ENCOUNTER
Spoke to patient. Made appointment at [facility] with Guera Moody for left knee due to her insurance. She stated she did not want an appointment until next month.     Made appointment per request and confirmed location for patient.   She stated she had a torn acl from 2000 and has discs for this.   Told her that they would be doing new xrays for patient and possible remri as the disc is 22 years old.     Patient understood and agreed.       ----- Message from Dianelys Keating MA sent at 2/7/2022  2:29 PM CST -----  Regarding: FW: Pt called to schedule a new pt appt for a torn acl and has a referral in the system  Sports med knee referral  ----- Message -----  From: Rachel Kim MA  Sent: 2/7/2022   2:26 PM CST  To: Dianelys Keating MA  Subject: RE: Pt called to schedule a new pt appt for #    That goes to sports  ----- Message -----  From: Dianelys Keating MA  Sent: 2/7/2022   2:23 PM CST  To: Malcolm Glover, #  Subject: FW: Pt called to schedule a new pt appt for #    Medicaid knee referral  ----- Message -----  From: Do Zarco  Sent: 2/7/2022   2:11 PM CST  To: [facility] Orth[facility] Triage  Subject: Pt called to schedule a new pt appt for a to#    Appointment Access Request:    Pt called to schedule a new pt appt for a torn acl and has a referral in the system.    Pt would like a call back today and can be reached at [phone]

## 2022-02-16 ENCOUNTER — TELEPHONE (OUTPATIENT)
Dept: PAIN MEDICINE | Facility: CLINIC | Age: 57
End: 2022-02-16
Payer: MEDICAID

## 2022-02-16 NOTE — TELEPHONE ENCOUNTER
----- Message from Sujit Acevedo sent at 2/16/2022  1:20 PM CST -----  Regarding: Patient advice  Contact: Pt  Pt called in regards to scheduling an appointment, Pt has referral from PCP, Unable to schedule Pt       Please advise , Pt can be reached at [phone]

## 2022-02-16 NOTE — TELEPHONE ENCOUNTER
Patient was contacted and informed that the referring PCP will have to contact Pain Mgmt provider directly before an appt is made regarding a conservative treatment plan before an appt is scheduled

## 2022-03-03 ENCOUNTER — TELEPHONE (OUTPATIENT)
Dept: PAIN MEDICINE | Facility: CLINIC | Age: 57
End: 2022-03-03
Payer: MEDICAID

## 2022-03-03 NOTE — TELEPHONE ENCOUNTER
"----- Message from Alia Holguin sent at 2/28/2022  9:28 AM CST -----  Regarding: Appointment  "Type:  Patient Call Back    Who Called:TAMIKO ZACARIAS [[id number]]    What is the reqeust in detail:pt requesting call back. Pt has an referral from PCP. Please advise    Can the clinic reply by [facility]? no    Best Call Back Number:[phone]      Additional Information:Pt open to any location      "

## 2022-03-03 NOTE — TELEPHONE ENCOUNTER
Staff spoke with patient regarding setting up an appt for , patient was informed that the provider does not take medicaid and she will not be able to be seen. Suggested her to visit [place] pain specialist. Patient verbalized understanding.

## 2022-03-29 DIAGNOSIS — M25.569 KNEE PAIN, UNSPECIFIED CHRONICITY, UNSPECIFIED LATERALITY: Primary | ICD-10-CM

## 2022-03-30 ENCOUNTER — TELEPHONE (OUTPATIENT)
Dept: ORTHOPEDICS | Facility: CLINIC | Age: 57
End: 2022-03-30
Payer: MEDICAID

## 2022-03-30 NOTE — TELEPHONE ENCOUNTER
Pt rescheduled appt to 5/4/22 at 9:30am with Guera. Pt aware to stop to first floor prior to her appt that day for knee xrs.

## 2022-03-30 NOTE — TELEPHONE ENCOUNTER
----- Message from Guera Moody PA-C sent at 3/29/2022  9:58 PM CDT -----  She has appt today (Wednesday) and needs bilateral knee XRs prior to seeing me.     Please call her to come in early. I have ordered the XRs.

## 2022-05-02 ENCOUNTER — TELEPHONE (OUTPATIENT)
Dept: ORTHOPEDICS | Facility: CLINIC | Age: 57
End: 2022-05-02
Payer: MEDICAID

## 2022-05-02 NOTE — TELEPHONE ENCOUNTER
LVM informing pt of need of xray's prior to visit on 05/04 @ 9:30. Recommended arriving 30 minutes before to get this completed. Let number if any questions.

## 2024-05-30 ENCOUNTER — HOSPITAL ENCOUNTER (EMERGENCY)
Facility: HOSPITAL | Age: 59
Discharge: HOME | End: 2024-05-30
Attending: STUDENT IN AN ORGANIZED HEALTH CARE EDUCATION/TRAINING PROGRAM
Payer: MEDICAID

## 2024-05-30 VITALS
RESPIRATION RATE: 18 BRPM | DIASTOLIC BLOOD PRESSURE: 72 MMHG | WEIGHT: 197 LBS | TEMPERATURE: 98 F | BODY MASS INDEX: 32.78 KG/M2 | OXYGEN SATURATION: 97 % | SYSTOLIC BLOOD PRESSURE: 123 MMHG | HEART RATE: 81 BPM

## 2024-05-30 DIAGNOSIS — S09.90XA INJURY OF HEAD, INITIAL ENCOUNTER: ICD-10-CM

## 2024-05-30 DIAGNOSIS — M25.552 LEFT HIP PAIN: ICD-10-CM
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
## 2024-05-30 DIAGNOSIS — M54.50 ACUTE LOW BACK PAIN, UNSPECIFIED BACK PAIN LATERALITY, UNSPECIFIED WHETHER SCIATICA PRESENT: Primary | ICD-10-CM

## 2024-05-30 DIAGNOSIS — G89.29 OTHER CHRONIC PAIN: ICD-10-CM

## 2024-05-30 DIAGNOSIS — M25.562 ACUTE PAIN OF LEFT KNEE: ICD-10-CM

## 2024-05-30 LAB
BASOPHILS # BLD AUTO: 0.03 K/UL (ref 0–0.2)
BASOPHILS NFR BLD: 0.5 % (ref 0–1.9)
BUN SERPL-MCNC: 21 MG/DL (ref 6–30)
CHLORIDE SERPL-SCNC: 101 MMOL/L (ref 95–110)
CREAT SERPL-MCNC: 0.8 MG/DL (ref 0.5–1.4)
DIFFERENTIAL METHOD BLD: ABNORMAL
EOSINOPHIL # BLD AUTO: 0 K/UL (ref 0–0.5)
EOSINOPHIL NFR BLD: 0.6 % (ref 0–8)
ERYTHROCYTE [DISTWIDTH] IN BLOOD BY AUTOMATED COUNT: 15.1 % (ref 11.5–14.5)
GLUCOSE SERPL-MCNC: 101 MG/DL (ref 70–110)
HCT VFR BLD AUTO: 41.1 % (ref 37–48.5)
HCT VFR BLD CALC: 32 %PCV (ref 36–54)
HCV AB SERPL QL IA: NORMAL
HGB BLD-MCNC: 13.5 G/DL (ref 12–16)
HIV 1+2 AB+HIV1 P24 AG SERPL QL IA: NORMAL
IMM GRANULOCYTES # BLD AUTO: 0.02 K/UL (ref 0–0.04)
IMM GRANULOCYTES NFR BLD AUTO: 0.3 % (ref 0–0.5)
LYMPHOCYTES # BLD AUTO: 2.9 K/UL (ref 1–4.8)
LYMPHOCYTES NFR BLD: 45.3 % (ref 18–48)
MCH RBC QN AUTO: 29.7 PG (ref 27–31)
MCHC RBC AUTO-ENTMCNC: 32.8 G/DL (ref 32–36)
MCV RBC AUTO: 90 FL (ref 82–98)
MONOCYTES # BLD AUTO: 0.4 K/UL (ref 0.3–1)
MONOCYTES NFR BLD: 6 % (ref 4–15)
NEUTROPHILS # BLD AUTO: 3 K/UL (ref 1.8–7.7)
NEUTROPHILS NFR BLD: 47.3 % (ref 38–73)
NRBC BLD-RTO: 0 /100 WBC
PLATELET # BLD AUTO: 355 K/UL (ref 150–450)
PMV BLD AUTO: 9.3 FL (ref 9.2–12.9)
POC IONIZED CALCIUM: 1.12 MMOL/L (ref 1.06–1.42)
POC TCO2 (MEASURED): 28 MMOL/L (ref 23–29)
POTASSIUM BLD-SCNC: 3.6 MMOL/L (ref 3.5–5.1)
RBC # BLD AUTO: 4.55 M/UL (ref 4–5.4)
SAMPLE: ABNORMAL
SODIUM BLD-SCNC: 137 MMOL/L (ref 136–145)
WBC # BLD AUTO: 6.36 K/UL (ref 3.9–12.7)

## 2024-05-30 PROCEDURE — 25000003 PHARM REV CODE 250: Performed by: PHYSICIAN ASSISTANT

## 2024-05-30 PROCEDURE — 85025 COMPLETE CBC W/AUTO DIFF WBC: CPT | Performed by: EMERGENCY MEDICINE

## 2024-05-30 PROCEDURE — 99285 EMERGENCY DEPT VISIT HI MDM: CPT | Mod: 25

## 2024-05-30 PROCEDURE — 87389 HIV-1 AG W/HIV-1&-2 AB AG IA: CPT | Performed by: STUDENT IN AN ORGANIZED HEALTH CARE EDUCATION/TRAINING PROGRAM

## 2024-05-30 PROCEDURE — 25000003 PHARM REV CODE 250: Performed by: EMERGENCY MEDICINE

## 2024-05-30 PROCEDURE — 86803 HEPATITIS C AB TEST: CPT | Performed by: STUDENT IN AN ORGANIZED HEALTH CARE EDUCATION/TRAINING PROGRAM

## 2024-05-30 PROCEDURE — 96360 HYDRATION IV INFUSION INIT: CPT

## 2024-05-30 RX ORDER — NAPROXEN 500 MG/1
500 TABLET ORAL 2 TIMES DAILY WITH MEALS
Qty: 60 TABLET | Refills: 0 | Status: SHIPPED | OUTPATIENT
Start: 2024-05-30

## 2024-05-30 RX ORDER — OXYCODONE HYDROCHLORIDE 5 MG/1
5 TABLET ORAL
Status: COMPLETED | OUTPATIENT
Start: 2024-05-30 | End: 2024-05-30

## 2024-05-30 RX ORDER — ACETAMINOPHEN 500 MG
1000 TABLET ORAL
Status: DISCONTINUED | OUTPATIENT
Start: 2024-05-30 | End: 2024-05-30 | Stop reason: HOSPADM

## 2024-05-30 RX ORDER — METHOCARBAMOL 500 MG/1
500 TABLET, FILM COATED ORAL 4 TIMES DAILY
Qty: 40 TABLET | Refills: 0 | Status: SHIPPED | OUTPATIENT
Start: 2024-05-30 | End: 2024-06-09

## 2024-05-30 RX ADMIN — SODIUM CHLORIDE 1000 ML: 0.9 INJECTION, SOLUTION INTRAVENOUS at 06:05

## 2024-05-30 RX ADMIN — OXYCODONE HYDROCHLORIDE 5 MG: 5 TABLET ORAL at 05:05

## 2024-05-30 NOTE — ED PROVIDER NOTES
Encounter Date: 5/30/2024       History     Chief Complaint   Patient presents with    Back Pain     Hx chronic back pain; was told to go to ER by PCP     59 y.o. Female with a PMHX of T1DM, GERD, HTN, HLD, osteoporosis, thyroid disease presents to the ED with multiple complaints. She complains of chronic back pain. It is diffuse and is non-radiating. States that she has a herniated disc. She had an MRI outpatient a few months back  though does not have the reports with her today. Pain has not changed, though has intensified. She denies saddle anesthesia, urinary retention, bowel incontinence, paresthesias.     She had a mechanical fall 3 days ago. She hit her head. She did not lose consciousness. She is not on a blood thinner. She has been having intermittent headaches, dizziness, blurry vision since fall. Her walkers breaks are broken causing her to fall.     She also complains of chronic left knee pain. She tore her ACL over 20 years ago. She recently had an Mri that also showed meniscus tear. She still wears a brace for support. She ambulates with a walker. She is trying to set up appointment with pain management though has not received a call back    She also complains of Left hip pain x1-2 years. Denies injury or overlying skin changes or fever.     The history is provided by the patient.     Review of patient's allergies indicates:   Allergen Reactions    Glimepiride Anaphylaxis    Lisinopril Anaphylaxis    Amlodipine Nausea And Vomiting     Past Medical History:   Diagnosis Date    ACL tear     Arthritis     Asthma     Diabetes mellitus     Diabetes mellitus type I     GERD (gastroesophageal reflux disease)     High cholesterol     Hypertension     Osteoporosis     Thyroid disease      Past Surgical History:   Procedure Laterality Date    HYSTERECTOMY      partial     Family History   Problem Relation Name Age of Onset    Hypertension Mother      Diabetes Mother       Social History     Tobacco Use     Smoking status: Former    Smokeless tobacco: Never   Substance Use Topics    Alcohol use: No    Drug use: No     Review of Systems   Constitutional:  Negative for fever.   Eyes:  Positive for visual disturbance.   Musculoskeletal:  Positive for arthralgias, back pain, gait problem and myalgias.   Neurological:  Positive for dizziness, weakness and headaches. Negative for numbness.       Physical Exam     Initial Vitals [05/30/24 1406]   BP Pulse Resp Temp SpO2   138/82 91 18 98.5 °F (36.9 °C) 97 %      MAP       --         Physical Exam    Nursing note and vitals reviewed.  Constitutional: She appears well-developed and well-nourished. She is not diaphoretic. She is uncooperative. No distress.   Uncooperative during examination. Unwilling to walk or participate in neuro physical examination    HENT:   Head: Normocephalic and atraumatic.   Nose: Nose normal.   Eyes: Conjunctivae and EOM are normal.   Neck: Neck supple.   Cardiovascular:  Normal rate.           Pulmonary/Chest: No respiratory distress.   Musculoskeletal:      Cervical back: Neck supple.      Lumbar back: Tenderness present.      Left hip: Tenderness present. Decreased range of motion.      Comments: No C spine ttp . Diffuse Lower back pain. DP pulse intact in BLE     Neurological: She is alert and oriented to person, place, and time. She has normal strength. No sensory deficit. She exhibits abnormal muscle tone. GCS eye subscore is 4. GCS verbal subscore is 5. GCS motor subscore is 6.   4/5 strength in LLE. 5/5 strength in RLE   Skin: No rash noted.   Psychiatric: She has a normal mood and affect. Thought content normal.         ED Course   Procedures  Labs Reviewed   CBC W/ AUTO DIFFERENTIAL - Abnormal; Notable for the following components:       Result Value    RDW 15.1 (*)     All other components within normal limits   ISTAT PROCEDURE - Abnormal; Notable for the following components:    POC Hematocrit 32 (*)     All other components within normal  limits   HIV 1 / 2 ANTIBODY    Narrative:     Release to patient->Immediate   HEPATITIS C ANTIBODY    Narrative:     Release to patient->Immediate   ISTAT CHEM8          Imaging Results              CT Head Without Contrast (Final result)  Result time 05/30/24 19:21:42      Final result by Simone Raza MD (05/30/24 19:21:42)                   Impression:      1. Allowing for motion artifact, no convincing acute intracranial abnormalities noting sequela of chronic microvascular ischemic change and senescent change.      Electronically signed by: Simone Raza MD  Date:    05/30/2024  Time:    19:21               Narrative:    EXAMINATION:  CT HEAD WITHOUT CONTRAST    CLINICAL HISTORY:  Head trauma, moderate-severe;    TECHNIQUE:  Low dose axial images were obtained through the head.  Coronal and sagittal reformations were also performed. Contrast was not administered.    COMPARISON:  None.    FINDINGS:  There is motion artifact.    There is generalized cerebral volume loss.  There is hypoattenuation in a periventricular fashion, likely sequela of chronic microvascular ischemic change.  There is no evidence of acute major vascular territory infarct, hemorrhage, or mass.  There is no hydrocephalus.  There are no abnormal extra-axial fluid collections.  The paranasal sinuses and mastoid air cells are clear, and there is no evidence of calvarial fracture.  The visualized soft tissues are unremarkable.                                       X-Ray Hip 2 or 3 views Left with Pelvis when performed (Final result)  Result time 05/30/24 18:23:32      Final result by Simone Raza MD (05/30/24 18:23:32)                   Impression:      1. No acute displaced fracture or dislocation of the left hip.      Electronically signed by: Simone Raza MD  Date:    05/30/2024  Time:    18:23               Narrative:    EXAMINATION:  XR HIP WITH PELVIS WHEN PERFORMED 2 OR 3 VIEWS LEFT    CLINICAL HISTORY:  Unspecified  fall, initial encounter    TECHNIQUE:  AP view of the pelvis and frog leg lateral view of the left hip were performed.    COMPARISON:  None    FINDINGS:  Three views left hip.    The bilateral sacroiliac joints are intact.  The pubic symphysis is intact.  The bilateral femoroacetabular joints are intact.                                       Medications   acetaminophen tablet 1,000 mg (1,000 mg Oral Not Given 5/30/24 1730)   sodium chloride 0.9% bolus 1,000 mL 1,000 mL (0 mLs Intravenous Stopped 5/30/24 1920)   oxyCODONE immediate release tablet 5 mg (5 mg Oral Given 5/30/24 1730)     Medical Decision Making  59 y.o. Female with a PMHX of T1DM, GERD, HTN, HLD, osteoporosis, thyroid disease presents to the ED with multiple complaints. Nontoxic appearing. Hemodynamically stable. Afebrile. Exam as above. I will initiate workup and reassess.    Ddx: concussion, ICH, chronic pain, Spondylosis, lumbar strain, disc herniation, spinal stenosis, compression fracture, mensicus injury        CTH head negative for ICH. She is alert and oriented x3. Grossly neuro intact.     Hip imaging negative for fracture or dislocation. DP pulse intact.     Knee pain is chronic. She denies changes in pain or recent injury. I do not believe imaging is indicated today     C/o lower back pain, though is chronic. She denies changes to her pain. Denies signs of cauda equina including saddle anesthesia, bowel incontinence, urinary retention. Refusing to walk or complete neuro testing during my exam, though is grossly neuro intact    On reassessment, she has improvement in her pain. Resting comfortably. Talking on the phone. No acute distress. Referral with pain management placed. I discussed pain control with robaxin and naproxen until she can see pain management. She is aggreeable to plan. Strict ED precautions given to return immediately for new, worsening, or concerning symptoms     Amount and/or Complexity of Data Reviewed  Labs:  ordered.  Radiology: ordered.    Risk  OTC drugs.  Prescription drug management.                                      Clinical Impression:  Final diagnoses:  [W19.XXXA] Fall  [M54.50] Acute low back pain, unspecified back pain laterality, unspecified whether sciatica present (Primary)  [M25.562] Acute pain of left knee  [M25.552] Left hip pain  [S09.90XA] Injury of head, initial encounter  [G89.29] Other chronic pain          ED Disposition Condition    Discharge Stable          ED Prescriptions       Medication Sig Dispense Start Date End Date Auth. Provider    naproxen (NAPROSYN) 500 MG tablet Take 1 tablet (500 mg total) by mouth 2 (two) times daily with meals. 60 tablet 5/30/2024 -- Suri Varma PA-C    methocarbamoL (ROBAXIN) 500 MG Tab Take 1 tablet (500 mg total) by mouth 4 (four) times daily. for 10 days 40 tablet 5/30/2024 6/9/2024 Suri Varma PA-C          Follow-up Information       Follow up With Specialties Details Why Contact Info Additional Information    [facility]  Schedule an appointment as soon as possible for a visit  Please schedule an appointment with Pain management [address] [facility] [id number]     [facility] - Pain Management Pain Medicine   [address]  [facility] [address]  [phone] [facility], 5th Floor             Suri Varma PA-C  05/30/24 2122

## 2024-05-30 NOTE — ED NOTES
"Pt. Is on phone yelling "They gave me a 5 mg pain pill!  I asked for a shot!  You need to come down here and be with me! I need something for anxiety.  If I have a panic attack it's on them!"  "

## 2024-05-30 NOTE — ED TRIAGE NOTES
Pt. Is a 59 yr old -American female presenting to the ED with back pain from book shelf falling onto left shoulder 24 yrs ago.  She now states she keeps falling and pain is 10/10.

## 2024-05-30 NOTE — ED NOTES
Bed: [facility]  Expected date:   Expected time:   Means of arrival:   Comments:

## 2024-05-30 NOTE — ED NOTES
Bed: [facility]5  Expected date:   Expected time:   Means of arrival:   Comments:  IR pt

## 2024-05-31 NOTE — DISCHARGE INSTRUCTIONS
Naproxen prescribed today for pain. Take as directed and needed. Take with meals       Robaxin prescribed today for back pain. Take are directed and needed. Caution as  this medication is sedating. Do not drive or drink alcohol while  taking this medication     Please schedule an appointment with pain management for chronic pain    Strict ED precautions given to return immediately for new, worsening, or concerning symptoms

## 2025-01-13 PROBLEM — I50.9 EDEMA DUE TO CONGESTIVE HEART FAILURE: Status: ACTIVE | Noted: 2025-01-13

## 2025-01-14 DIAGNOSIS — M25.511 CHRONIC RIGHT SHOULDER PAIN: ICD-10-CM

## 2025-01-14 DIAGNOSIS — G89.29 CHRONIC RIGHT SHOULDER PAIN: ICD-10-CM

## 2025-01-14 DIAGNOSIS — M25.571 RIGHT ANKLE PAIN, UNSPECIFIED CHRONICITY: ICD-10-CM

## 2025-01-14 DIAGNOSIS — G89.29 CHRONIC BILATERAL LOW BACK PAIN WITH BILATERAL SCIATICA: Primary | ICD-10-CM

## 2025-01-14 DIAGNOSIS — M54.41 CHRONIC BILATERAL LOW BACK PAIN WITH BILATERAL SCIATICA: Primary | ICD-10-CM

## 2025-01-14 DIAGNOSIS — M54.42 CHRONIC BILATERAL LOW BACK PAIN WITH BILATERAL SCIATICA: Primary | ICD-10-CM
